# Patient Record
Sex: FEMALE | URBAN - METROPOLITAN AREA
[De-identification: names, ages, dates, MRNs, and addresses within clinical notes are randomized per-mention and may not be internally consistent; named-entity substitution may affect disease eponyms.]

---

## 2021-05-04 ENCOUNTER — OFFICE VISIT (OUTPATIENT)
Dept: URBAN - METROPOLITAN AREA CLINIC 12 | Facility: CLINIC | Age: 61
End: 2021-05-04

## 2025-01-08 ENCOUNTER — OFFICE VISIT (OUTPATIENT)
Dept: URBAN - METROPOLITAN AREA CLINIC 78 | Facility: CLINIC | Age: 65
End: 2025-01-08
Payer: COMMERCIAL

## 2025-01-08 ENCOUNTER — DASHBOARD ENCOUNTERS (OUTPATIENT)
Age: 65
End: 2025-01-08

## 2025-01-08 VITALS
HEIGHT: 59 IN | TEMPERATURE: 98.1 F | DIASTOLIC BLOOD PRESSURE: 83 MMHG | BODY MASS INDEX: 30.44 KG/M2 | WEIGHT: 151 LBS | HEART RATE: 92 BPM | SYSTOLIC BLOOD PRESSURE: 132 MMHG

## 2025-01-08 DIAGNOSIS — K21.9 CHRONIC GERD: ICD-10-CM

## 2025-01-08 DIAGNOSIS — M41.9 SEVERE SCOLIOSIS: ICD-10-CM

## 2025-01-08 DIAGNOSIS — R13.12 OROPHARYNGEAL DYSPHAGIA: ICD-10-CM

## 2025-01-08 DIAGNOSIS — Z86.73 HISTORY OF STROKE: ICD-10-CM

## 2025-01-08 PROBLEM — 71457002: Status: ACTIVE | Noted: 2025-01-08

## 2025-01-08 PROBLEM — 298382003: Status: ACTIVE | Noted: 2025-01-08

## 2025-01-08 PROBLEM — 235595009: Status: ACTIVE | Noted: 2025-01-08

## 2025-01-08 PROCEDURE — 99243 OFF/OP CNSLTJ NEW/EST LOW 30: CPT | Performed by: INTERNAL MEDICINE

## 2025-01-08 PROCEDURE — 99203 OFFICE O/P NEW LOW 30 MIN: CPT | Performed by: INTERNAL MEDICINE

## 2025-01-08 NOTE — PHYSICAL EXAM CHEST:
breathing is un-labored without accessory muscle use, normal breath sounds- rib tenderness- costochondral tenderness

## 2025-01-08 NOTE — HPI-TODAY'S VISIT:
Patient was referred by Dr. Dorian Bal  A copy of this document will be sent to the physician.   Presents with epigastric discomfort and acid reflux symptoms - Acid reflux triggered by coffee and wine consumption - Reflux symptoms typically begin after coffee intake and with random foods despite avoiding greasy foods - Currently using Pepcid (famotidine) after consuming triggers, which provides some relief - Reports gluten intolerance with subsequent dietary modifications - History of dysphagia for 33 years following stroke - Dysphagia primarily affects oral phase of swallowing after food is chewed - No aspiration symptoms reported - Bowel habits: Occasional constipation, rare episodes of diarrhea - Managing constipation with Kefir (probiotics) - Reports recent weight loss - Joint pain: Intermittent right knee pain - History of scoliosis with left-sided curvature - Past surgical history: Cholecystectomy for gallstones - History of 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

## 2025-01-08 NOTE — PHYSICAL EXAM GASTROINTESTINAL
Abdomen , soft, epigastric tenderness sternal tip, nondistended , no guarding or rigidity , no masses palpable , normal bowel sounds , Liver and Spleen , no hepatomegaly present , liver nontender , spleen not palpable